# Patient Record
Sex: MALE | Race: WHITE | Employment: OTHER | URBAN - METROPOLITAN AREA
[De-identification: names, ages, dates, MRNs, and addresses within clinical notes are randomized per-mention and may not be internally consistent; named-entity substitution may affect disease eponyms.]

---

## 2017-08-29 RX ORDER — ATROPINE SULFATE 0.1 MG/ML
0.5 INJECTION INTRAVENOUS
Status: CANCELLED | OUTPATIENT
Start: 2017-08-29 | End: 2017-08-29

## 2017-08-29 RX ORDER — EPINEPHRINE 0.1 MG/ML
1 INJECTION INTRACARDIAC; INTRAVENOUS
Status: CANCELLED | OUTPATIENT
Start: 2017-08-29 | End: 2017-08-29

## 2017-08-29 RX ORDER — DEXTROMETHORPHAN/PSEUDOEPHED 2.5-7.5/.8
1.2 DROPS ORAL
Status: CANCELLED | OUTPATIENT
Start: 2017-08-29

## 2017-08-30 ENCOUNTER — HOSPITAL ENCOUNTER (OUTPATIENT)
Age: 77
Setting detail: OUTPATIENT SURGERY
Discharge: HOME | End: 2017-08-30
Attending: INTERNAL MEDICINE | Admitting: INTERNAL MEDICINE
Payer: MEDICARE

## 2017-08-30 VITALS
BODY MASS INDEX: 32.44 KG/M2 | HEART RATE: 61 BPM | RESPIRATION RATE: 17 BRPM | HEIGHT: 69 IN | DIASTOLIC BLOOD PRESSURE: 60 MMHG | TEMPERATURE: 97.2 F | OXYGEN SATURATION: 94 % | SYSTOLIC BLOOD PRESSURE: 130 MMHG | WEIGHT: 219 LBS

## 2017-08-30 PROCEDURE — G0500 MOD SEDAT ENDO SERVICE >5YRS: HCPCS | Performed by: INTERNAL MEDICINE

## 2017-08-30 PROCEDURE — 88305 TISSUE EXAM BY PATHOLOGIST: CPT | Performed by: INTERNAL MEDICINE

## 2017-08-30 PROCEDURE — 74011250636 HC RX REV CODE- 250/636

## 2017-08-30 PROCEDURE — 74011000250 HC RX REV CODE- 250: Performed by: INTERNAL MEDICINE

## 2017-08-30 PROCEDURE — 74011250636 HC RX REV CODE- 250/636: Performed by: INTERNAL MEDICINE

## 2017-08-30 PROCEDURE — 76040000007: Performed by: INTERNAL MEDICINE

## 2017-08-30 PROCEDURE — 88304 TISSUE EXAM BY PATHOLOGIST: CPT | Performed by: INTERNAL MEDICINE

## 2017-08-30 PROCEDURE — 77030011640 HC PAD GRND REM COVD -A: Performed by: INTERNAL MEDICINE

## 2017-08-30 PROCEDURE — 77030013991 HC SNR POLYP ENDOSC BSC -A: Performed by: INTERNAL MEDICINE

## 2017-08-30 RX ORDER — FLUMAZENIL 0.1 MG/ML
0.2 INJECTION INTRAVENOUS
Status: ACTIVE | OUTPATIENT
Start: 2017-08-30 | End: 2017-08-30

## 2017-08-30 RX ORDER — NALOXONE HYDROCHLORIDE 0.4 MG/ML
0.4 INJECTION, SOLUTION INTRAMUSCULAR; INTRAVENOUS; SUBCUTANEOUS
Status: ACTIVE | OUTPATIENT
Start: 2017-08-30 | End: 2017-08-30

## 2017-08-30 RX ORDER — SODIUM CHLORIDE 9 MG/ML
100 INJECTION, SOLUTION INTRAVENOUS CONTINUOUS
Status: DISPENSED | OUTPATIENT
Start: 2017-08-30 | End: 2017-08-30

## 2017-08-30 RX ORDER — MIDAZOLAM HYDROCHLORIDE 1 MG/ML
.5-5 INJECTION, SOLUTION INTRAMUSCULAR; INTRAVENOUS
Status: ACTIVE | OUTPATIENT
Start: 2017-08-30 | End: 2017-08-30

## 2017-08-30 RX ORDER — FENTANYL CITRATE 50 UG/ML
100 INJECTION, SOLUTION INTRAMUSCULAR; INTRAVENOUS
Status: ACTIVE | OUTPATIENT
Start: 2017-08-30 | End: 2017-08-30

## 2017-08-30 RX ADMIN — SODIUM CHLORIDE 100 ML/HR: 900 INJECTION, SOLUTION INTRAVENOUS at 12:51

## 2017-08-30 RX ADMIN — BENZOCAINE 2 SPRAY: 220 SPRAY, METERED PERIODONTAL at 13:07

## 2017-08-30 RX ADMIN — GLUCAGON HYDROCHLORIDE 0.5 MG: KIT at 12:22

## 2017-08-30 NOTE — DISCHARGE INSTRUCTIONS
[facility]  [id number]  1940    EGD DISCHARGE INSTRUCTIONS  Discomfort:  Sore throat- throat lozenges or warm salt water gargle  redness at IV site- apply warm compress to area; if redness or soreness persist- contact your physician  Gaseous discomfort- walking, belching will help relieve any discomfort  You may not operate a vehicle until the next day  You may not engage in an occupation involving machinery or appliances until the next day  You may not drink alcoholic beverages until the next day  Avoid making any critical decisions for at least 24 hour    DIET   You may not resume your regular diet. Antireflux diet. ACTIVITY  You may not resume your normal daily activities   Spend the remainder of the day resting -  avoid any strenuous activity. CALL M.D. ANY SIGN OF   Increasing pain, nausea, vomiting  Abdominal distension (swelling)  New increased bleeding (oral or rectal)  Fever (chills)  Pain in chest area  Bloody discharge from nose or mouth  Shortness of breath     You may not not take any Advil, Aspirin, Ibuprofen, Motrin, Aleve, or Goodys for 10 days, ONLY  Tylenol as needed for pain. Follow-up Instructions: Follow-up in the office as scheduled or make a follow-up appointment in 2 weeks. Jerman Little MD  August 30, 2017   DISCHARGE SUMMARY from Nurse    The following personal items are in your possession at time of discharge:    Dental Appliances: Uppers, Partials, At home, Lowers  Visual Aid: Glasses (reading)                            PATIENT INSTRUCTIONS:    After general anesthesia or intravenous sedation, for 24 hours or while taking prescription Narcotics:  · Limit your activities  · Do not drive and operate hazardous machinery  · Do not make important personal or business decisions  · Do  not drink alcoholic beverages  · If you have not urinated within 8 hours after discharge, please contact your surgeon on call.     Report the following to your surgeon:  · Excessive pain, swelling, redness or odor of or around the surgical area  · Temperature over 100.5  · Nausea and vomiting lasting longer than 4 hours or if unable to take medications  · Any signs of decreased circulation or nerve impairment to extremity: change in color, persistent  numbness, tingling, coldness or increase pain  · Any questions        What to do at Home:  Recommended activity: Activity as tolerated and no driving for today,     If you experience any of the following symptoms as above, please follow up with Dr. Obinna Mckeon. *  Please give a list of your current medications to your Primary Care Provider. *  Please update this list whenever your medications are discontinued, doses are      changed, or new medications (including over-the-counter products) are added. *  Please carry medication information at all times in case of emergency situations. These are general instructions for a healthy lifestyle:    No smoking/ No tobacco products/ Avoid exposure to second hand smoke    Surgeon General's Warning:  Quitting smoking now greatly reduces serious risk to your health. Obesity, smoking, and sedentary lifestyle greatly increases your risk for illness    A healthy diet, regular physical exercise & weight monitoring are important for maintaining a healthy lifestyle    You may be retaining fluid if you have a history of heart failure or if you experience any of the following symptoms:  Weight gain of 3 pounds or more overnight or 5 pounds in a week, increased swelling in our hands or feet or shortness of breath while lying flat in bed. Please call your doctor as soon as you notice any of these symptoms; do not wait until your next office visit.     Recognize signs and symptoms of STROKE:    F-face looks uneven    A-arms unable to move or move unevenly    S-speech slurred or non-existent    T-time-call 911 as soon as signs and symptoms begin-DO NOT go       Back to bed or wait to see if you get better-TIME IS BRAIN. Warning Signs of HEART ATTACK     Call 911 if you have these symptoms:   Chest discomfort. Most heart attacks involve discomfort in the center of the chest that lasts more than a few minutes, or that goes away and comes back. It can feel like uncomfortable pressure, squeezing, fullness, or pain.  Discomfort in other areas of the upper body. Symptoms can include pain or discomfort in one or both arms, the back, neck, jaw, or stomach.  Shortness of breath with or without chest discomfort.  Other signs may include breaking out in a cold sweat, nausea, or lightheadedness. Don't wait more than five minutes to call 911 - MINUTES MATTER! Fast action can save your life. Calling 911 is almost always the fastest way to get lifesaving treatment. Emergency Medical Services staff can begin treatment when they arrive -- up to an hour sooner than if someone gets to the hospital by car. The discharge information has been reviewed with the patient and spouse. The patient and spouse verbalized understanding. Discharge medications reviewed with the patient and spouse and appropriate educational materials and side effects teaching were provided.     Patient armband removed and shredded

## 2017-08-30 NOTE — IP AVS SNAPSHOT
[facility] [address] [address] 
[phone] Patient: Britney Stahl MRN: [id number] UMR:[id number] You are allergic to the following Allergen Reactions Codeine Nausea Only Phenobarbital Nausea Only Recent Documentation Height Weight BMI Smoking Status 1.753 m 99.3 kg 32.34 kg/m2 Former Smoker Emergency Contacts Name Discharge Info Relation Home Work Mobile Lynda Taylor  Spouse [phone] About your hospitalization You were admitted on:  August 30, 2017 You last received care in the:  [facility] ENDOSCOPY You were discharged on:  August 30, 2017 Unit phone number:  [phone] Why you were hospitalized Your primary diagnosis was:  Not on File Providers Seen During Your Hospitalizations Provider Role Specialty Primary office phone Marilin Brennan MD Attending Provider Gastroenterology [phone] Your Primary Care Physician (PCP) Primary Care Physician Office Phone Office Fax [address], [facility] Vira Mae [phone] Follow-up Information None Current Discharge Medication List  
  
ASK your doctor about these medications Dose & Instructions Dispensing Information Comments Morning Noon Evening Bedtime  
 amLODIPine 5 mg tablet Commonly known as:  Job Dub Your last dose was: Your next dose is:    
   
   
 Dose:  5 mg Take 5 mg by mouth daily. Indications: HYPERTENSION Refills:  0  
     
   
   
   
  
 amoxicillin 500 mg capsule Commonly known as:  AMOXIL Your last dose was: Your next dose is:    
   
   
 Dose:  500 mg Take 500 mg by mouth two (2) times daily as needed ([employer]). Refills:  0  
     
   
   
   
  
 aspirin delayed-release 81 mg tablet Your last dose was:     
   
Your next dose is:    
   
   
 Dose:  81 mg  
 Take 81 mg by mouth daily. Refills:  0 KRILL OIL PO Your last dose was: Your next dose is: Take  by mouth daily. Refills:  0  
     
   
   
   
  
 losartan 100 mg tablet Commonly known as:  COZAAR Your last dose was: Your next dose is:    
   
   
 Dose:  100 mg Take 100 mg by mouth daily. Indications: HYPERTENSION Refills:  0  
     
   
   
   
  
 metoprolol tartrate 25 mg tablet Commonly known as:  LOPRESSOR Your last dose was: Your next dose is:    
   
   
 Dose:  25 mg Take 25 mg by mouth two (2) times a day. Refills:  0  
     
   
   
   
  
 multivitamin tablet Commonly known as:  ONE A DAY Your last dose was: Your next dose is:    
   
   
 Dose:  1 Tab Take 1 Tab by mouth daily. Refills:  0  
     
   
   
   
  
 pantoprazole 40 mg tablet Commonly known as:  PROTONIX Your last dose was: Your next dose is:    
   
   
 Dose:  40 mg Take 40 mg by mouth daily. Indications: GASTROESOPHAGEAL REFLUX Refills:  0  
     
   
   
   
  
 simvastatin 40 mg tablet Commonly known as:  ZOCOR Your last dose was: Your next dose is:    
   
   
 Dose:  40 mg Take 40 mg by mouth nightly. Refills:  0  
     
   
   
   
  
 testosterone 20.25 mg/1.25 gram (1.62 %) gel Commonly known as:  ANDROGEL Your last dose was: Your next dose is:    
   
   
 Apply  to affected area daily. Refills:  0 VIAGRA 100 mg tablet Generic drug:  sildenafil citrate Your last dose was: Your next dose is:    
   
   
 Dose:  100 mg Take 100 mg by mouth as needed. Indications: ERECTILE DYSFUNCTION Refills:  0  
     
   
   
   
  
 VITAMIN D3 1,000 unit tablet Generic drug:  cholecalciferol Your last dose was: Your next dose is:    
   
   
 Dose:  2000 Units Take 2,000 Units by mouth daily. Refills:  0 Discharge Instructions Shruti Moore [id number] 
1940 EGD DISCHARGE INSTRUCTIONS Discomfort: 
Sore throat- throat lozenges or warm salt water gargle 
redness at IV site- apply warm compress to area; if redness or soreness persist- contact your physician Gaseous discomfort- walking, belching will help relieve any discomfort You may not operate a vehicle until the next day You may not engage in an occupation involving machinery or appliances until the next day You may not drink alcoholic beverages until the next day Avoid making any critical decisions for at least 24 hour DIET You may not resume your regular diet. Antireflux diet. ACTIVITY You may not resume your normal daily activities Spend the remainder of the day resting -  avoid any strenuous activity. CALL M.D. ANY SIGN OF Increasing pain, nausea, vomiting Abdominal distension (swelling) New increased bleeding (oral or rectal) Fever (chills) Pain in chest area Bloody discharge from nose or mouth Shortness of breath You may not not take any Advil, Aspirin, Ibuprofen, Motrin, Aleve, or Goodys for 10 days, ONLY  Tylenol as needed for pain. Follow-up Instructions: Follow-up in the office as scheduled or make a follow-up appointment in 2 weeks. Juan Griffith MD 
August 30, 2017 DISCHARGE SUMMARY from Nurse The following personal items are in your possession at time of discharge: 
 
Dental Appliances: Uppers, Partials, At home, Lowers Visual Aid: Glasses (reading) PATIENT INSTRUCTIONS: 
 
 
F-face looks uneven A-arms unable to move or move unevenly S-speech slurred or non-existent T-time-call 911 as soon as signs and symptoms begin-DO NOT go Back to bed or wait to see if you get better-TIME IS BRAIN. Warning Signs of HEART ATTACK Call 911 if you have these symptoms: 
? Chest discomfort. Most heart attacks involve discomfort in the center of the chest that lasts more than a few minutes, or that goes away and comes back. It can feel like uncomfortable pressure, squeezing, fullness, or pain. ? Discomfort in other areas of the upper body. Symptoms can include pain or discomfort in one or both arms, the back, neck, jaw, or stomach. ? Shortness of breath with or without chest discomfort. ? Other signs may include breaking out in a cold sweat, nausea, or lightheadedness. Don't wait more than five minutes to call [address]! Fast action can save your life. Calling 911 is almost always the fastest way to get lifesaving treatment. Emergency Medical Services staff can begin treatment when they arrive  up to an hour sooner than if someone gets to the hospital by car. The discharge information has been reviewed with the patient and spouse. The patient and spouse verbalized understanding. Discharge medications reviewed with the patient and spouse and appropriate educational materials and side effects teaching were provided. Patient armband removed and shredded Discharge Orders None Introducing [facility]! [employer] introduces [employer] patient portal. Now you can access parts of your medical record, email your doctor's office, and request medication refills online. 1. In your internet browser, go to [email or web address] 2. Click on the First Time User? Click Here link in the Sign In box. You will see the New Member Sign Up page. 3. Enter your [employer] Access Code exactly as it appears below.  You will not need to use this code after youve completed the sign-up process. If you do not sign up before the expiration date, you must request a new code. · [address] Access Code: [email or web address] Expires: 11/19/2017  6:52 AM 
 
4. Enter the last four digits of your Social Security Number (xxxx) and Date of Birth (mm/dd/yyyy) as indicated and click Submit. You will be taken to the next sign-up page. 5. Create a [address] ID. This will be your [facility] [address] login ID and cannot be changed, so think of one that is secure and easy to remember. 6. Create a [address] password. You can change your password at any time. 7. Enter your Password Reset Question and Answer. This can be used at a later time if you forget your password. 8. Enter your e-mail address. You will receive e-mail notification when new information is available in [address] 9. Click Sign Up. You can now view and download portions of your medical record. 10. Click the Download Summary menu link to download a portable copy of your medical information. If you have questions, please visit the Frequently Asked Questions section of the [employer] website. Remember, [facility] is NOT to be used for urgent needs. For medical emergencies, dial 911. Now available from your iPhone and Android! General Information Please provide this summary of care documentation to your next provider. Patient Signature:  ____________________________________________________________ Date:  ____________________________________________________________  
  
[facility] Provider Signature:  ____________________________________________________________ Date:  ____________________________________________________________

## 2017-08-30 NOTE — PROCEDURES
[facility]  Esophagogastroduodenoscopy Procedure Report  _______________________________________________________  Patient: Winnie Morse                                         Attending Physician: Bernard Chan MD    Patient ID: [id number]                                      Referring Physician: Mandy Browne MD    Exam Date: August 30, 2017 _______________________________________________________      Introduction: A  68 y.o. male patient, presents for Esophagogastroduodenoscopy Procedure. Indication: chronic GERD on Protonix 40 mg daily. On EGD done last year April 27 2016 showed a 1.5 cm submucosal nodule or tumor in the proximal jejunum. The biopsy of the mucosa was normal. We wanted to have a second look at the lesion after one year to make sure it has not increased in size    : Bernard Chan MD    Sedation:    Versed 69 mg IV, fentanyl 100 mcg IV, topical Hurricaine spray, glucagon . 5 mg IV      Procedure Details:  After infomed consent was obtained for the procedure, with all risks and benefits of procedure explained the patient was taken to the endoscopy suite and placed in the left lateral decubitus position. Following sequential administration of sedation as per above, the endoscope was inserted into the mouth and advanced under direct vision to proximal jejunum. A careful inspection was made as the gastroscope was withdrawn, including a retroflexed view of the proximal stomach; findings and interventions are described below. Findings:   HYPOPHARYNX AND LARYNX: Normal  Esophagus: Normal proximal, middle and distal esophagus possibly a mild distal esophageal smooth stenosis. This time no visible Hiatal hernia. Stomach: No food or liquid retention. Normal, cardia, fundus, body, lesser curvature, incisura, antrum and pylorus except for a small isolated prepyloric erosion. Mucosa is normal.    Duodenum/jejunum:    The bulb, second, third portions and major papilla are normal submucosal semipedunculated polypoid lesion about >13 mm in diameter with normal overlying mucosa but because of its relatively large size and being semipedunculated it was resected with hot snare revealing what appears to be a lipoma at the section line. So it is a completely benign lesion    Therapies:    1 polypectomy were performed using hot snare  and the polyps were  retrieved    Specimen: One           Complications:   None    EBL:  None. 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 This a benign lesion and does not need future endoscopic follow up          Recommendations: -Continue acid suppression. , -GERD diet: avoid fried and fatty foods. peppermint, chocolate, alcohol, coffee, citrus fruits and juices, tomoato products; avoid lying down for 2 to 3 hours after eating., -No NSAID'S.     Assistant: none    Bernard Chan MD  8/30/2017  1:29 PM

## 2019-05-26 ENCOUNTER — APPOINTMENT (OUTPATIENT)
Dept: ULTRASOUND IMAGING | Age: 79
End: 2019-05-26
Attending: EMERGENCY MEDICINE
Payer: MEDICARE

## 2019-05-26 ENCOUNTER — HOSPITAL ENCOUNTER (EMERGENCY)
Age: 79
Discharge: HOME | End: 2019-05-26
Attending: EMERGENCY MEDICINE
Payer: MEDICARE

## 2019-05-26 ENCOUNTER — APPOINTMENT (OUTPATIENT)
Dept: GENERAL RADIOLOGY | Age: 79
End: 2019-05-26
Attending: EMERGENCY MEDICINE
Payer: MEDICARE

## 2019-05-26 VITALS
TEMPERATURE: 99 F | RESPIRATION RATE: 20 BRPM | HEIGHT: 68 IN | SYSTOLIC BLOOD PRESSURE: 123 MMHG | DIASTOLIC BLOOD PRESSURE: 57 MMHG | BODY MASS INDEX: 33.04 KG/M2 | HEART RATE: 57 BPM | OXYGEN SATURATION: 96 % | WEIGHT: 218 LBS

## 2019-05-26 DIAGNOSIS — R07.9 CHEST PAIN, UNSPECIFIED TYPE: Primary | ICD-10-CM

## 2019-05-26 LAB
ALBUMIN SERPL-MCNC: 3.7 G/DL (ref 3.4–5)
ALBUMIN/GLOB SERPL: 1.2 {RATIO} (ref 0.8–1.7)
ALP SERPL-CCNC: 79 U/L (ref 45–117)
ALT SERPL-CCNC: 18 U/L (ref 16–61)
ANION GAP SERPL CALC-SCNC: 7 MMOL/L (ref 3–18)
AST SERPL-CCNC: 16 U/L (ref 15–37)
ATRIAL RATE: 63 BPM
BASOPHILS # BLD: 0 K/UL (ref 0–0.1)
BASOPHILS NFR BLD: 0 % (ref 0–2)
BILIRUB SERPL-MCNC: 1.1 MG/DL (ref 0.2–1)
BUN SERPL-MCNC: 18 MG/DL (ref 7–18)
BUN/CREAT SERPL: 20 (ref 12–20)
CALCIUM SERPL-MCNC: 9.3 MG/DL (ref 8.5–10.1)
CALCULATED P AXIS, ECG09: 72 DEGREES
CALCULATED R AXIS, ECG10: 15 DEGREES
CALCULATED T AXIS, ECG11: 58 DEGREES
CHLORIDE SERPL-SCNC: 104 MMOL/L (ref 100–108)
CK MB CFR SERPL CALC: 2.2 % (ref 0–4)
CK MB SERPL-MCNC: 1 NG/ML (ref 5–25)
CK SERPL-CCNC: 45 U/L (ref 39–308)
CO2 SERPL-SCNC: 31 MMOL/L (ref 21–32)
CREAT SERPL-MCNC: 0.88 MG/DL (ref 0.6–1.3)
DIAGNOSIS, 93000: NORMAL
DIFFERENTIAL METHOD BLD: ABNORMAL
EOSINOPHIL # BLD: 0.1 K/UL (ref 0–0.4)
EOSINOPHIL NFR BLD: 1 % (ref 0–5)
ERYTHROCYTE [DISTWIDTH] IN BLOOD BY AUTOMATED COUNT: 12.1 % (ref 11.6–14.5)
GLOBULIN SER CALC-MCNC: 3 G/DL (ref 2–4)
GLUCOSE SERPL-MCNC: 126 MG/DL (ref 74–99)
HCT VFR BLD AUTO: 41.1 % (ref 36–48)
HGB BLD-MCNC: 13.5 G/DL (ref 13–16)
LIPASE SERPL-CCNC: 96 U/L (ref 73–393)
LYMPHOCYTES # BLD: 0.8 K/UL (ref 0.9–3.6)
LYMPHOCYTES NFR BLD: 13 % (ref 21–52)
MCH RBC QN AUTO: 33.7 PG (ref 24–34)
MCHC RBC AUTO-ENTMCNC: 32.8 G/DL (ref 31–37)
MCV RBC AUTO: 102.5 FL (ref 74–97)
MONOCYTES # BLD: 0.4 K/UL (ref 0.05–1.2)
MONOCYTES NFR BLD: 7 % (ref 3–10)
NEUTS SEG # BLD: 4.9 K/UL (ref 1.8–8)
NEUTS SEG NFR BLD: 79 % (ref 40–73)
P-R INTERVAL, ECG05: 184 MS
PLATELET # BLD AUTO: 153 K/UL (ref 135–420)
PMV BLD AUTO: 10.6 FL (ref 9.2–11.8)
POTASSIUM SERPL-SCNC: 4.2 MMOL/L (ref 3.5–5.5)
PROT SERPL-MCNC: 6.7 G/DL (ref 6.4–8.2)
Q-T INTERVAL, ECG07: 406 MS
QRS DURATION, ECG06: 100 MS
QTC CALCULATION (BEZET), ECG08: 415 MS
RBC # BLD AUTO: 4.01 M/UL (ref 4.7–5.5)
SODIUM SERPL-SCNC: 142 MMOL/L (ref 136–145)
TROPONIN I SERPL-MCNC: <0.02 NG/ML (ref 0–0.04)
VENTRICULAR RATE, ECG03: 63 BPM
WBC # BLD AUTO: 6.3 K/UL (ref 4.6–13.2)

## 2019-05-26 PROCEDURE — 93005 ELECTROCARDIOGRAM TRACING: CPT

## 2019-05-26 PROCEDURE — 83690 ASSAY OF LIPASE: CPT

## 2019-05-26 PROCEDURE — 82550 ASSAY OF CK (CPK): CPT

## 2019-05-26 PROCEDURE — 80053 COMPREHEN METABOLIC PANEL: CPT

## 2019-05-26 PROCEDURE — 85025 COMPLETE CBC W/AUTO DIFF WBC: CPT

## 2019-05-26 PROCEDURE — 71046 X-RAY EXAM CHEST 2 VIEWS: CPT

## 2019-05-26 PROCEDURE — 99284 EMERGENCY DEPT VISIT MOD MDM: CPT

## 2019-05-26 PROCEDURE — 76705 ECHO EXAM OF ABDOMEN: CPT

## 2019-05-26 NOTE — ED TRIAGE NOTES
Patient ambulatory  into ER c/o chest pain x 2 days. Pt denies N/V, denies diaphoresis w/ CP.   Pt describes the pain as beginning in his throat and traveling down the chest.

## 2019-05-26 NOTE — ED PROVIDER NOTES
EMERGENCY DEPARTMENT HISTORY AND PHYSICAL EXAM    Date: 5/26/2019  Patient Name: Mandie Simpson    History of Presenting Illness     Chief Complaint   Patient presents with    Chest Pain         History Provided By: Patient    1:52 PM  Mandie Simpson is a 66 y.o. male with PMHX of high blood pressure and coronary artery disease who presents to the emergency department C/O intermittent chest pain for the last 2 days. It generally is worse when he eats or drinks anything. Pain is described as pain/ indigestion, pain does go from the patient's throat down to his upper abdomen,  is not made worse with exertion. He has no nausea no vomiting no shortness of breath no diaphoresis. PCP: Karma Barnett MD    Current Outpatient Medications   Medication Sig Dispense Refill    sildenafil citrate (VIAGRA) 100 mg tablet Take 100 mg by mouth as needed. Indications: ERECTILE DYSFUNCTION      amoxicillin (AMOXIL) 500 mg capsule Take 500 mg by mouth two (2) times daily as needed (Rosecea).  metoprolol (LOPRESSOR) 25 mg tablet Take 25 mg by mouth two (2) times a day.  simvastatin (ZOCOR) 40 mg tablet Take 40 mg by mouth nightly.  amLODIPine (NORVASC) 5 mg tablet Take 5 mg by mouth daily. Indications: HYPERTENSION      aspirin delayed-release 81 mg tablet Take 81 mg by mouth daily.  testosterone (ANDROGEL) 20.25 mg/1.25 gram (1.62 %) gel Apply  to affected area daily.  losartan (COZAAR) 100 mg tablet Take 100 mg by mouth daily. Indications: HYPERTENSION      pantoprazole (PROTONIX) 40 mg tablet Take 40 mg by mouth daily. Indications: GASTROESOPHAGEAL REFLUX      multivitamin (ONE A DAY) tablet Take 1 Tab by mouth daily.  cholecalciferol (VITAMIN D3) 1,000 unit tablet Take 2,000 Units by mouth daily.  KRILL OIL PO Take  by mouth daily.          Past History     Past Medical History:  Past Medical History:   Diagnosis Date    Arthritis     CAD (coronary artery disease) s/p CABG    GERD (gastroesophageal reflux disease)     well controlled    Hypercholesteremia     Hypertension     Morbid obesity ([address]        Past Surgical History:  Past Surgical History:   Procedure Laterality Date    ABDOMEN SURGERY PROC UNLISTED Right     hernia repair    CARDIAC SURG PROCEDURE UNLIST      CABGX2 or 3    COLONOSCOPY N/A 10/7/2016    COLONOSCOPY, POLYPECTOMY performed by Kemar Frances MD at [facility] ENDOSCOPY    HX APPENDECTOMY      HX [address] Right 10/14/2018    HX ORTHOPAEDIC Right     total right knee replacement    HX ORTHOPAEDIC      back surgery    HX SHOULDER REPLACEMENT Right     HX TONSIL AND ADENOIDECTOMY         Family History:  History reviewed. No pertinent family history. Social History:  Social History     Tobacco Use    Smoking status: Former Smoker     Last attempt to quit: 10/5/1976     Years since quittin.6    Smokeless tobacco: Never Used   Substance Use Topics    Alcohol use: Yes     Alcohol/week: 4.8 - 5.4 oz     Types: 7 Shots of liquor, 1 - 2 Standard drinks or equivalent per week    Drug use: No       Allergies: Allergies   Allergen Reactions    Codeine Nausea Only    Phenobarbital Nausea Only         Review of Systems   Review of Systems   Constitutional: Negative for fever. HENT: Negative for ear pain and hearing loss. Eyes: Negative for pain and visual disturbance. Respiratory: Negative for shortness of breath. Cardiovascular: Negative for chest pain. Gastrointestinal: Negative for abdominal pain. Genitourinary: Negative for difficulty urinating and dysuria. Neurological: Negative for dizziness, light-headedness and headaches.          Physical Exam     Vitals:    19 1314 19 1500   BP: 139/49 170/67   Pulse: (!) 56 (!) 59   Resp: 15 20   Temp: 99 °F (37.2 °C)    SpO2: 95% 99%   Weight: 98.9 kg (218 lb)    Height: 5' 8\" (1.727 m)      Physical Exam   Constitutional: He is oriented to person, place, and time. He appears well-developed. HENT:   Head: Normocephalic and atraumatic. Eyes: Pupils are equal, round, and reactive to light. Neck: Normal range of motion. Cardiovascular: Normal rate and regular rhythm. Pulmonary/Chest: Effort normal and breath sounds normal.   Abdominal: Soft. Musculoskeletal: Normal range of motion. Neurological: He is alert and oriented to person, place, and time. Psychiatric: He has a normal mood and affect. Nursing note and vitals reviewed. Diagnostic Study Results     Labs -     Recent Results (from the past 12 hour(s))   EKG, 12 LEAD, INITIAL    Collection Time: 05/26/19  1:36 PM   Result Value Ref Range    Ventricular Rate 63 BPM    Atrial Rate 63 BPM    P-R Interval 184 ms    QRS Duration 100 ms    Q-T Interval 406 ms    QTC Calculation (Bezet) 415 ms    Calculated P Axis 72 degrees    Calculated R Axis 15 degrees    Calculated T Axis 58 degrees    Diagnosis       Normal sinus rhythm with sinus arrhythmia  Incomplete right bundle branch block  Anterior infarct , age undetermined  Abnormal ECG  When compared with ECG of 29-FEB-2016 17:28,  NE interval has decreased  Anterior infarct is now present     CBC WITH AUTOMATED DIFF    Collection Time: 05/26/19  1:53 PM   Result Value Ref Range    WBC 6.3 4.6 - 13.2 K/uL    RBC 4.01 (L) 4.70 - 5.50 M/uL    HGB 13.5 13.0 - 16.0 g/dL    HCT 41.1 36.0 - 48.0 %    .5 (H) 74.0 - 97.0 FL    MCH 33.7 24.0 - 34.0 PG    MCHC 32.8 31.0 - 37.0 g/dL    RDW 12.1 11.6 - 14.5 %    PLATELET 368 752 - 510 K/uL    MPV 10.6 9.2 - 11.8 FL    NEUTROPHILS 79 (H) 40 - 73 %    LYMPHOCYTES 13 (L) 21 - 52 %    MONOCYTES 7 3 - 10 %    EOSINOPHILS 1 0 - 5 %    BASOPHILS 0 0 - 2 %    ABS. NEUTROPHILS 4.9 1.8 - 8.0 K/UL    ABS. LYMPHOCYTES 0.8 (L) 0.9 - 3.6 K/UL    ABS. MONOCYTES 0.4 0.05 - 1.2 K/UL    ABS. EOSINOPHILS 0.1 0.0 - 0.4 K/UL    ABS.  BASOPHILS 0.0 0.0 - 0.1 K/UL    DF AUTOMATED     METABOLIC PANEL, COMPREHENSIVE Collection Time: 05/26/19  1:53 PM   Result Value Ref Range    Sodium 142 136 - 145 mmol/L    Potassium 4.2 3.5 - 5.5 mmol/L    Chloride 104 100 - 108 mmol/L    CO2 31 21 - 32 mmol/L    Anion gap 7 3.0 - 18 mmol/L    Glucose 126 (H) 74 - 99 mg/dL    BUN 18 7.0 - 18 MG/DL    Creatinine 0.88 0.6 - 1.3 MG/DL    BUN/Creatinine ratio 20 12 - 20      GFR est AA >60 >60 ml/min/1.73m2    GFR est non-AA >60 >60 ml/min/1.73m2    Calcium 9.3 8.5 - 10.1 MG/DL    Bilirubin, total 1.1 (H) 0.2 - 1.0 MG/DL    ALT (SGPT) 18 16 - 61 U/L    AST (SGOT) 16 15 - 37 U/L    Alk. phosphatase 79 45 - 117 U/L    Protein, total 6.7 6.4 - 8.2 g/dL    Albumin 3.7 3.4 - 5.0 g/dL    Globulin 3.0 2.0 - 4.0 g/dL    A-G Ratio 1.2 0.8 - 1.7     LIPASE    Collection Time: 05/26/19  1:53 PM   Result Value Ref Range    Lipase 96 73 - 393 U/L   CARDIAC PANEL,(CK, CKMB & TROPONIN)    Collection Time: 05/26/19  1:53 PM   Result Value Ref Range    CK 45 39 - 308 U/L    CK - MB 1.0 <3.6 ng/ml    CK-MB Index 2.2 0.0 - 4.0 %    Troponin-I, QT <0.02 0.0 - 0.045 NG/ML       Radiologic Studies -   US ABD LTD   Final Result      No significant findings to explain the patient's abdominal pain      XR CHEST PA LAT   Final Result      No acute pulmonary findings         CT Results  (Last 48 hours)    None        CXR Results  (Last 48 hours)               05/26/19 1403  XR CHEST PA LAT Final result    Impression:      No acute pulmonary findings        Narrative:  EXAM: PA and lateral views of the chest       INDICATION: Chest pain       COMPARISON: February 29, 2016       _______________       FINDINGS:       There are median sternotomy wires. The heart is normal size. The mediastinum is   within normal limits. There is no consolidation. Pulmonary vascularity is within   normal limits. No pleural effusion or pneumothorax. No acute osseous   abnormalities.        _______________                 Medications given in the ED-  Medications - No data to display      Medical Decision Making   I am the first provider for this patient. I reviewed the vital signs, available nursing notes, past medical history, past surgical history, family history and social history. EKG interpretation: (Preliminary)  EKG read by Dr. Jenelle Carreon at 1:53 PM   EKG shows a sinus rhythm with incomplete right bundle branch block with a rate of 63 and a QTC of 415. Records Reviewed: Old Medical Records    Provider Notes (Medical Decision Making): Deann Pitts is a 66 y.o. male history of coronary artery disease, hypertension presenting with concern for intermittent chest pain that is worse with eating, and patient states the pain goes from his neck down to his upper abdomen. Currently symptom-free. EKG shows no acute signs of ischemia. Will check basic labs and a chest xray, as well as an ultrasound given the pain radiates to the upper abdomen and is worse with food. Procedures:  Procedures    ED Course:   3:01 PM  On reevaluation patient is feeling better he has an unremarkable ultrasound and labs are unremarkable as well. Patient was offered to stay in the hospital for further evaluation of his chest discomfort however patient states he thinks this is his indigestion and does not want to stay for even delta troponin and would like to be discharged. He was strongly encouraged to follow-up with his PCM on Tuesday but strict return precautions were given. Diagnosis and Disposition       DISCHARGE NOTE:    Siena Horn's  results have been reviewed with him. He has been counseled regarding his diagnosis, treatment, and plan. He verbally conveys understanding and agreement of the signs, symptoms, diagnosis, treatment and prognosis and additionally agrees to follow up as discussed. He also agrees with the care-plan and conveys that all of his questions have been answered.   I have also provided discharge instructions for him that include: educational information regarding their diagnosis and treatment, and list of reasons why they would want to return to the ED prior to their follow-up appointment, should his condition change. He has been provided with education for proper emergency department utilization. CLINICAL IMPRESSION:    1. Chest pain, unspecified type        PLAN:  1. D/C Home  2. Current Discharge Medication List        3. Follow-up Information     Follow up With Specialties Details Why Contact Info    Lai Delgado MD Family Practice  Follow up tuesday for reevauation  [address] [facility]  [phone]          _______________________________      Please note that this dictation was completed with Yelp, the computer voice recognition software. Quite often unanticipated grammatical, syntax, homophones, and other interpretive errors are inadvertently transcribed by the computer software. Please disregard these errors. Please excuse any errors that have escaped final proofreading.

## 2019-05-26 NOTE — DISCHARGE INSTRUCTIONS

## 2023-01-01 ENCOUNTER — APPOINTMENT (OUTPATIENT)
Facility: HOSPITAL | Age: 83
End: 2023-01-01
Payer: MEDICARE

## 2023-01-01 ENCOUNTER — HOSPITAL ENCOUNTER (EMERGENCY)
Facility: HOSPITAL | Age: 83
Discharge: HOME | End: 2023-03-04
Payer: MEDICARE

## 2023-01-01 ENCOUNTER — HOSPITAL ENCOUNTER (EMERGENCY)
Facility: HOSPITAL | Age: 83
End: 2023-03-01
Attending: EMERGENCY MEDICINE
Payer: MEDICARE

## 2023-01-01 VITALS
OXYGEN SATURATION: 94 % | RESPIRATION RATE: 18 BRPM | BODY MASS INDEX: 26.41 KG/M2 | WEIGHT: 173.7 LBS | TEMPERATURE: 98.7 F | SYSTOLIC BLOOD PRESSURE: 58 MMHG | HEART RATE: 40 BPM | DIASTOLIC BLOOD PRESSURE: 35 MMHG

## 2023-01-01 DIAGNOSIS — S27.0XXA TRAUMATIC PNEUMOTHORAX, INITIAL ENCOUNTER: ICD-10-CM

## 2023-01-01 DIAGNOSIS — I46.9 CARDIAC ARREST (HCC): ICD-10-CM

## 2023-01-01 DIAGNOSIS — R11.2 NAUSEA AND VOMITING, UNSPECIFIED VOMITING TYPE: Primary | ICD-10-CM

## 2023-01-01 LAB
ANION GAP BLD CALC-SCNC: ABNORMAL (ref 10–20)
ARTERIAL PATENCY WRIST A: ABNORMAL
BASE DEFICIT BLD-SCNC: 10.6 MMOL/L
BASE DEFICIT BLD-SCNC: 7.5 MMOL/L
BASOPHILS # BLD: 0.1 K/UL (ref 0–0.1)
BASOPHILS NFR BLD: 0 % (ref 0–2)
BDY SITE: ABNORMAL
CA-I BLD-MCNC: 1.3 MMOL/L (ref 1.12–1.32)
CHLORIDE BLD-SCNC: 104 MMOL/L (ref 98–107)
CO2 BLD-SCNC: 26 MMOL/L (ref 19–24)
CREAT BLD-MCNC: 1.05 MG/DL (ref 0.6–1.3)
DIFFERENTIAL METHOD BLD: ABNORMAL
EKG DIAGNOSIS: NORMAL
EKG Q-T INTERVAL: 456 MS
EKG QRS DURATION: 98 MS
EKG QTC CALCULATION (BAZETT): 459 MS
EKG R AXIS: -10 DEGREES
EKG T AXIS: 51 DEGREES
EKG VENTRICULAR RATE: 61 BPM
EOSINOPHIL # BLD: 0.1 K/UL (ref 0–0.4)
EOSINOPHIL NFR BLD: 1 % (ref 0–5)
ERYTHROCYTE [DISTWIDTH] IN BLOOD BY AUTOMATED COUNT: 12 % (ref 11.6–14.5)
GAS FLOW.O2 O2 DELIVERY SYS: ABNORMAL
GAS FLOW.O2 SETTING OXYMISER: 25 BPM
GLUCOSE BLD STRIP.AUTO-MCNC: 244 MG/DL (ref 70–110)
GLUCOSE BLD-MCNC: 254 MG/DL (ref 65–100)
HCO3 BLD-SCNC: 21.8 MMOL/L (ref 22–26)
HCO3 BLD-SCNC: 25.5 MMOL/L (ref 22–26)
HCT VFR BLD AUTO: 41.8 % (ref 36–48)
HGB BLD-MCNC: 12.9 G/DL (ref 13–16)
IMM GRANULOCYTES # BLD AUTO: 0.7 K/UL (ref 0–0.04)
IMM GRANULOCYTES NFR BLD AUTO: 4 % (ref 0–0.5)
LACTATE BLD-SCNC: 11.88 MMOL/L (ref 0.4–2)
LYMPHOCYTES # BLD: 3.2 K/UL (ref 0.9–3.6)
LYMPHOCYTES NFR BLD: 19 % (ref 21–52)
MCH RBC QN AUTO: 33.5 PG (ref 24–34)
MCHC RBC AUTO-ENTMCNC: 30.9 G/DL (ref 31–37)
MCV RBC AUTO: 108.6 FL (ref 78–100)
MONOCYTES # BLD: 0.7 K/UL (ref 0.05–1.2)
MONOCYTES NFR BLD: 4 % (ref 3–10)
NEUTS SEG # BLD: 12.4 K/UL (ref 1.8–8)
NEUTS SEG NFR BLD: 72 % (ref 40–73)
NRBC # BLD: 0.05 K/UL (ref 0–0.01)
NRBC BLD-RTO: 0.3 PER 100 WBC
O2/TOTAL GAS SETTING VFR VENT: 100 %
PCO2 BLD: 83.1 MMHG (ref 35–45)
PCO2 BLDV: 94.1 MMHG (ref 41–51)
PEEP RESPIRATORY: 5 CMH2O
PH BLD: 7.03 (ref 7.35–7.45)
PH BLDV: 7.04 (ref 7.32–7.42)
PLATELET # BLD AUTO: 136 K/UL (ref 135–420)
PMV BLD AUTO: 11.4 FL (ref 9.2–11.8)
PO2 BLD: 138 MMHG (ref 80–100)
PO2 BLDV: 30 MMHG (ref 25–40)
POTASSIUM BLD-SCNC: 5 MMOL/L (ref 3.5–5.1)
RBC # BLD AUTO: 3.85 M/UL (ref 4.35–5.65)
SAO2 % BLD: 33 %
SAO2 % BLD: 97.1 % (ref 92–97)
SERVICE CMNT-IMP: ABNORMAL
SERVICE CMNT-IMP: ABNORMAL
SODIUM BLD-SCNC: 145 MMOL/L (ref 136–145)
SPECIMEN SITE: ABNORMAL
SPECIMEN TYPE: ABNORMAL
VENTILATION MODE VENT: ABNORMAL
VT SETTING VENT: 400 ML
WBC # BLD AUTO: 17.2 K/UL (ref 4.6–13.2)

## 2023-01-01 PROCEDURE — 2580000003 HC RX 258: Performed by: EMERGENCY MEDICINE

## 2023-01-01 PROCEDURE — 92950 HEART/LUNG RESUSCITATION CPR: CPT

## 2023-01-01 PROCEDURE — 36600 WITHDRAWAL OF ARTERIAL BLOOD: CPT

## 2023-01-01 PROCEDURE — 96365 THER/PROPH/DIAG IV INF INIT: CPT

## 2023-01-01 PROCEDURE — 99285 EMERGENCY DEPT VISIT HI MDM: CPT

## 2023-01-01 PROCEDURE — 71045 X-RAY EXAM CHEST 1 VIEW: CPT

## 2023-01-01 PROCEDURE — 2500000003 HC RX 250 WO HCPCS

## 2023-01-01 PROCEDURE — 85025 COMPLETE CBC W/AUTO DIFF WBC: CPT

## 2023-01-01 PROCEDURE — 82947 ASSAY GLUCOSE BLOOD QUANT: CPT

## 2023-01-01 PROCEDURE — 82330 ASSAY OF CALCIUM: CPT

## 2023-01-01 PROCEDURE — 96366 THER/PROPH/DIAG IV INF ADDON: CPT

## 2023-01-01 PROCEDURE — 2500000003 HC RX 250 WO HCPCS: Performed by: EMERGENCY MEDICINE

## 2023-01-01 PROCEDURE — 93005 ELECTROCARDIOGRAM TRACING: CPT | Performed by: EMERGENCY MEDICINE

## 2023-01-01 PROCEDURE — 6360000002 HC RX W HCPCS: Performed by: EMERGENCY MEDICINE

## 2023-01-01 PROCEDURE — 82803 BLOOD GASES ANY COMBINATION: CPT

## 2023-01-01 PROCEDURE — 94002 VENT MGMT INPAT INIT DAY: CPT

## 2023-01-01 RX ORDER — EPINEPHRINE 0.1 MG/ML
SYRINGE (ML) INJECTION DAILY PRN
Status: COMPLETED | OUTPATIENT
Start: 2023-01-01 | End: 2023-01-01

## 2023-01-01 RX ORDER — NOREPINEPHRINE BIT/0.9 % NACL 16MG/250ML
INFUSION BOTTLE (ML) INTRAVENOUS
Status: COMPLETED
Start: 2023-01-01 | End: 2023-01-01

## 2023-01-01 RX ORDER — NOREPINEPHRINE BIT/0.9 % NACL 16MG/250ML
1-100 INFUSION BOTTLE (ML) INTRAVENOUS CONTINUOUS
Status: DISCONTINUED | OUTPATIENT
Start: 2023-01-01 | End: 2023-01-01 | Stop reason: HOSPADM

## 2023-01-01 RX ORDER — 0.9 % SODIUM CHLORIDE 0.9 %
INTRAVENOUS SOLUTION INTRAVENOUS CONTINUOUS PRN
Status: COMPLETED | OUTPATIENT
Start: 2023-01-01 | End: 2023-01-01

## 2023-01-01 RX ORDER — CALCIUM CHLORIDE 100 MG/ML
INJECTION INTRAVENOUS; INTRAVENTRICULAR DAILY PRN
Status: COMPLETED | OUTPATIENT
Start: 2023-01-01 | End: 2023-01-01

## 2023-01-01 RX ADMIN — CALCIUM CHLORIDE 1000 MG: 100 INJECTION, SOLUTION INTRAVENOUS at 03:06

## 2023-01-01 RX ADMIN — SODIUM BICARBONATE 50 MEQ: 84 INJECTION, SOLUTION INTRAVENOUS at 03:10

## 2023-01-01 RX ADMIN — SODIUM CHLORIDE 1000 ML: 900 INJECTION, SOLUTION INTRAVENOUS at 03:06

## 2023-01-01 RX ADMIN — EPINEPHRINE 200 MCG: 0.1 INJECTION, SOLUTION ENDOTRACHEAL; INTRACARDIAC; INTRAVENOUS at 04:00

## 2023-01-01 RX ADMIN — EPINEPHRINE 1 MG: 0.1 INJECTION, SOLUTION ENDOTRACHEAL; INTRACARDIAC; INTRAVENOUS at 03:10

## 2023-01-01 RX ADMIN — EPINEPHRINE 100 MCG: 0.1 INJECTION, SOLUTION ENDOTRACHEAL; INTRACARDIAC; INTRAVENOUS at 04:05

## 2023-01-01 RX ADMIN — Medication 4 MCG/MIN: at 03:16

## 2023-01-01 RX ADMIN — EPINEPHRINE 100 MCG: 0.1 INJECTION, SOLUTION ENDOTRACHEAL; INTRACARDIAC; INTRAVENOUS at 04:17

## 2023-01-01 RX ADMIN — EPINEPHRINE 1 MG: 0.1 INJECTION, SOLUTION ENDOTRACHEAL; INTRACARDIAC; INTRAVENOUS at 03:06

## 2023-02-15 ENCOUNTER — HOSPITAL ENCOUNTER (EMERGENCY)
Facility: HOSPITAL | Age: 83
Discharge: HOME | End: 2023-02-15
Attending: STUDENT IN AN ORGANIZED HEALTH CARE EDUCATION/TRAINING PROGRAM
Payer: MEDICARE

## 2023-02-15 ENCOUNTER — APPOINTMENT (OUTPATIENT)
Facility: HOSPITAL | Age: 83
End: 2023-02-15
Payer: MEDICARE

## 2023-02-15 VITALS
WEIGHT: 220 LBS | HEIGHT: 68 IN | DIASTOLIC BLOOD PRESSURE: 51 MMHG | RESPIRATION RATE: 16 BRPM | OXYGEN SATURATION: 96 % | HEART RATE: 62 BPM | TEMPERATURE: 97.9 F | BODY MASS INDEX: 33.34 KG/M2 | SYSTOLIC BLOOD PRESSURE: 136 MMHG

## 2023-02-15 DIAGNOSIS — R07.9 CHEST PAIN, UNSPECIFIED TYPE: Primary | ICD-10-CM

## 2023-02-15 DIAGNOSIS — R13.19 ESOPHAGEAL DYSPHAGIA: ICD-10-CM

## 2023-02-15 LAB
ALBUMIN SERPL-MCNC: 3.5 G/DL (ref 3.4–5)
ALBUMIN/GLOB SERPL: 1.2 (ref 0.8–1.7)
ALP SERPL-CCNC: 75 U/L (ref 45–117)
ALT SERPL-CCNC: 21 U/L (ref 16–61)
ANION GAP SERPL CALC-SCNC: 0 MMOL/L (ref 3–18)
AST SERPL-CCNC: 17 U/L (ref 10–38)
BASOPHILS # BLD: 0 K/UL (ref 0–0.1)
BASOPHILS NFR BLD: 0 % (ref 0–2)
BILIRUB SERPL-MCNC: 1.1 MG/DL (ref 0.2–1)
BUN SERPL-MCNC: 21 MG/DL (ref 7–18)
BUN/CREAT SERPL: 25 (ref 12–20)
CALCIUM SERPL-MCNC: 9.9 MG/DL (ref 8.5–10.1)
CHLORIDE SERPL-SCNC: 104 MMOL/L (ref 100–111)
CO2 SERPL-SCNC: 36 MMOL/L (ref 21–32)
CREAT SERPL-MCNC: 0.85 MG/DL (ref 0.6–1.3)
DIFFERENTIAL METHOD BLD: ABNORMAL
EOSINOPHIL # BLD: 0.2 K/UL (ref 0–0.4)
EOSINOPHIL NFR BLD: 2 % (ref 0–5)
ERYTHROCYTE [DISTWIDTH] IN BLOOD BY AUTOMATED COUNT: 12.3 % (ref 11.6–14.5)
GLOBULIN SER CALC-MCNC: 3 G/DL (ref 2–4)
GLUCOSE SERPL-MCNC: 120 MG/DL (ref 74–99)
HCT VFR BLD AUTO: 38 % (ref 36–48)
HGB BLD-MCNC: 12.4 G/DL (ref 13–16)
IMM GRANULOCYTES # BLD AUTO: 0 K/UL (ref 0–0.04)
IMM GRANULOCYTES NFR BLD AUTO: 1 % (ref 0–0.5)
LYMPHOCYTES # BLD: 1 K/UL (ref 0.9–3.6)
LYMPHOCYTES NFR BLD: 11 % (ref 21–52)
MCH RBC QN AUTO: 33.3 PG (ref 24–34)
MCHC RBC AUTO-ENTMCNC: 32.6 G/DL (ref 31–37)
MCV RBC AUTO: 102.2 FL (ref 78–100)
MONOCYTES # BLD: 0.5 K/UL (ref 0.05–1.2)
MONOCYTES NFR BLD: 6 % (ref 3–10)
NEUTS SEG # BLD: 7 K/UL (ref 1.8–8)
NEUTS SEG NFR BLD: 80 % (ref 40–73)
NRBC # BLD: 0 K/UL (ref 0–0.01)
NRBC BLD-RTO: 0 PER 100 WBC
PLATELET # BLD AUTO: 156 K/UL (ref 135–420)
PMV BLD AUTO: 10.9 FL (ref 9.2–11.8)
POTASSIUM SERPL-SCNC: 4.1 MMOL/L (ref 3.5–5.5)
PROT SERPL-MCNC: 6.5 G/DL (ref 6.4–8.2)
RBC # BLD AUTO: 3.72 M/UL (ref 4.35–5.65)
SODIUM SERPL-SCNC: 140 MMOL/L (ref 136–145)
TROPONIN I SERPL HS-MCNC: 12 NG/L (ref 0–78)
WBC # BLD AUTO: 8.7 K/UL (ref 4.6–13.2)

## 2023-02-15 PROCEDURE — 80053 COMPREHEN METABOLIC PANEL: CPT

## 2023-02-15 PROCEDURE — 85025 COMPLETE CBC W/AUTO DIFF WBC: CPT

## 2023-02-15 PROCEDURE — 99285 EMERGENCY DEPT VISIT HI MDM: CPT

## 2023-02-15 PROCEDURE — 71045 X-RAY EXAM CHEST 1 VIEW: CPT

## 2023-02-15 PROCEDURE — 84484 ASSAY OF TROPONIN QUANT: CPT

## 2023-02-15 RX ORDER — SUCRALFATE 1 G/1
1 TABLET ORAL 4 TIMES DAILY PRN
Qty: 40 TABLET | Refills: 3 | Status: SHIPPED | OUTPATIENT
Start: 2023-02-15 | End: 2023-02-15 | Stop reason: SDUPTHER

## 2023-02-15 RX ORDER — SUCRALFATE 1 G/1
1 TABLET ORAL 4 TIMES DAILY PRN
Qty: 40 TABLET | Refills: 3 | Status: SHIPPED | OUTPATIENT
Start: 2023-02-15 | End: 2023-02-25

## 2023-02-15 NOTE — ED TRIAGE NOTES
Pt presents to ED with pmhx of dysphagia and c/c of possible pork chop in throat, pt voice sounds wet and pt is spitting out secretions, pt is able to make needs known speaking in complete sentences

## 2023-02-15 NOTE — DISCHARGE INSTRUCTIONS
Please carefully read all discharge instructions  Please follow up with your primary care doctor in 1-2 days    Please follow-up with a primary care physician and if you do not have one currently use the contact information provided to obtain an appointment. If none was provided please call the number on the back of your insurance card to locate a Primary care doctor. Many offices have \"cancellation lists\" that you can ask to be placed on; should a patient with an earlier appointment cancel you will be notified to take their place. Please return to the Emergency Room immediately if your symptoms worsen. Please return to the Emergency Department if you develop a fever, chills, cannot eat or drink due to nausea or vomiting, or if any of your symptoms worsen. If you do not have insurance you can use the below for your medications. [email or web address]    What are GoodRx coupons? GoodRx coupons will help you pay less than the cash price for your prescription. Becka Chary free to use and are accepted at virtually every [place] pharmacy. Your pharmacist will know how to enter the codes on the coupon to pull up the lowest discount available. Ze[employer]elin Activation    Thank you for requesting access to [employer]. Please follow the instructions below to securely access and download your online medical record. Ze[employer]elin allows you to send messages to your doctor, view your test results, renew your prescriptions, schedule appointments, and more. How Do I Sign Up? In your internet browser, go to [email or web address]  Click on the First Time User? Click Here link in the Sign In box. You will be redirect to the New Member Sign Up page. Enter your [employer] Access Code exactly as it appears below. You will not need to use this code after youve completed the sign-up process. If you do not sign up before the expiration date, you must request a new code.     Zeppelin Access Code: [email or web address]  Expires: 4/1/2023  1:51 PM    Enter the last four digits of your Social Security Number (xxxx) and Date of Birth (mm/dd/yyyy) as indicated and click Submit. You will be taken to the next sign-up page. Create a [employer] ID. This will be your [employer] login ID and cannot be changed, so think of one that is secure and easy to remember. Create a [employer] password. You can change your password at any time. Enter your Password Reset Question and Answer. This can be used at a later time if you forget your password. Enter your e-mail address. You will receive e-mail notification when new information is available in [address] Click Sign Up. You can now view and download portions of your medical record. Click the Allworx link to download a portable copy of your medical information. Additional Information    If you have questions, please visit the Frequently Asked Questions section of the [employer] website at [email or web address] Remember, [employer] is NOT to be used for urgent needs. For medical emergencies, dial 911.

## 2023-02-15 NOTE — ED PROVIDER NOTES
[facility] EMERGENCY DEPT  EMERGENCY DEPARTMENT ENCOUNTER    Patient Name: Mert Armendariz  MRN: [id number]  YOB: 1940  Provider: Rj Moody MD  PCP: Oneyda Currie MD   Time/Date of evaluation: 12:24 PM EST on 2/15/23    History of Presenting Illness     Chief Complaint   Patient presents with    Chest Pain    Dysphagia       History Provided By: Patient and Patient's Wife     History Chris Cheatham):   Mert Armendariz is a 80 y.o. male who presents to the emergency department with concerns over dysphagia since last night. He believes it was meat. He has been doing well and not had any problems in \"quite some time\" . He has had a chronic problem with dysphagia over the years and says that he has had esophageal dilation several times. He had one event that he had to have Dr. Marianela Patel clear the food bolus. At the time of my exam, all of his symptoms are resolved. The chest pain that he had he states was from the bolus, not acs like pain, not sob, no NVD, tolerated liquids fine. Nursing Notes were all reviewed and agreed with or any disagreements were addressed in the HPI.     Past History     Past Medical History:  Past Medical History:   Diagnosis Date    Arthritis     CAD (coronary artery disease)     s/p CABG    GERD (gastroesophageal reflux disease)     well controlled    Hypercholesteremia     Hypertension     Morbid obesity ([address]        Past Surgical History:  Past Surgical History:   Procedure Laterality Date    APPENDECTOMY      COLONOSCOPY N/A 10/7/2016    COLONOSCOPY, POLYPECTOMY performed by Nely Guerra MD at [address] [place] [address] 10/14/2018    ORTHOPEDIC SURGERY Right     total right knee replacement    ORTHOPEDIC SURGERY      back surgery    [place] ABDOMEN SURGERY PROC UNLISTED Right     hernia repair    [place] CARDIAC SURG PROCEDURE UNLIST  2007    CABGX2 or 3    SHOULDER ARTHROPLASTY Right     TONSILLECTOMY AND ADENOIDECTOMY       Family History:  No family history on file.    Social History:  Social History     Tobacco Use    Smoking status: Former     Types: Cigarettes     Quit date: 10/5/1976     Years since quittin.4    Smokeless tobacco: Never   Substance Use Topics    Alcohol use: Yes     Alcohol/week: 8.0 - 9.0 standard drinks    Drug use: No       Medications:  No current facility-administered medications for this encounter.     Current Outpatient Medications   Medication Sig Dispense Refill    sucralfate (CARAFATE) 1 GM tablet Take 1 tablet by mouth 4 times daily as needed (gerd) 40 tablet 3       Allergies:  Allergies   Allergen Reactions    Codeine Nausea Only       Social Determinants of Health:  Social Determinants of Health     Tobacco Use: Not on file   Alcohol Use: Not on file   Financial Resource Strain: Not on file   Food Insecurity: Not on file   Transportation Needs: Not on file   Physical Activity: Not on file   Stress: Not on file   Social Connections: Not on file   Intimate Partner Violence: Not on file   Depression: Not on file   Housing Stability: Not on file       Review of Systems     Negative except as listed above in HPI.    Physical Exam     Vitals:    02/15/23 1155 02/15/23 1247   BP: (!) 136/51    Pulse: (!) 103 62   Resp: 16    Temp: 97.9 °F (36.6 °C)    SpO2: 93% 96%   Weight: 220 lb (99.8 kg)    Height: 5' 8\" (1.727 m)      Physical Exam  Vital signs wdl  General: Patient is awake and alert, resting comfortably in no acute distress  Head: Normocephalic and atraumatic  Eyes: Extraocular muscles intact, no conjunctival pallor  Ear, nose, throat: Normal external exam  Neck: Normal range of motion  Cardiovascular: RRR, warm, well-perfused extremities  Respiratory: Patient is in no respiratory distress, normal respiratory effort  GI: soft, non-tender, non-distended  MSK: No gross deformities appreciated  Extremities: pulses intact with good cap refills, no LE pitting edema or calf tenderness  Skin:  Warm, dry, and intact  Neuro: The patient is alert and oriented, no gross motor or sensory defects noted. Psych: Appropriate mood and affect. Diagnostic Study Results     Labs:  No results found for this or any previous visit (from the past 12 hour(s)). Radiologic Studies:   Non-plain film images such as CT, Ultrasound and MRI are read by the radiologist. Plain radiographic images are visualized and preliminarily interpreted by the ED Provider with the below findings:       Interpretation per the Radiologist below, if available at the time of this note:    XR CHEST PORTABLE   Final Result   No acute findings. EKG interpretation:  EKG non diagnostic, without acute ST elevation, arrhythmia, prolonged QT, or evidence of Brugada      Procedures     Unless otherwise noted below, none. Procedures    ED Course     12:24 PM EST JARVIS Rich MD) am the first provider for this patient. Initial assessment performed. I reviewed the vital signs, available nursing notes, past medical history, past surgical history, family history and social history. The patients presenting problems have been discussed, and they are in agreement with the care plan formulated and outlined with them. I have encouraged them to ask questions as they arise throughout their visit. Records Reviewed: Nursing Notes and Old Medical Records    Is this patient to be included in the SEP-1 core measure due to severe sepsis or septic shock? No Exclusion criteria - the patient is NOT to be included for SEP-1 Core Measure due to: Infection is not suspected    MEDICATIONS ADMINISTERED IN THE ED:  Medications - No data to display    ED Course as of 02/19/23 2013   Wed Feb 15, 2023   1350 Patient feeling much better now, food passed, tolerating PO fluids.    Will give carafate.  [DM]      ED Course User Index  [DM] Dayne Newton MD       None    Screenings                  [facility] Assessment  BP: (!) 136/51  Heart Rate: 62       Medical Decision Making     DDX: suspected globus, now resolved. No further complaints. Considered but do not suspect acs, pe, no further pathology. Not c/w gerd or PUD. DISCUSSION:  Severity of condition: mild  Acuity of condition: chornic    80 y.o. male well appearing, nad. In evaluation of the above differential diagnosis, consideration was given to the following tests and treatments. The decision to perform testing and results were discussed with the patient and spouse. I discussed each of these tests and considerations with the patient and spouse. They agree with the plan of discharge and outpatient follow-up. Additional Considerations: The following Chronic Conditions impacted the patient's care: history of esophageal dilatation    No acute pathology necessitating further emergent workup or hospital admission is suspected or found. Will discharge home with follow up. He is comfortable with the plan and discharge at this time. Expressed the importance of follow up for current symptoms and he agrees and was advised on what signs/symptoms to return immediately to the ER. Diagnosis and Disposition     DISPOSITION:  DISPOSITION Decision To Discharge 02/15/2023 02:11:03 PM      DISCHARGE NOTE:  Lyla Malave's  results have been reviewed with him. He has been counseled regarding his diagnosis, treatment, and plan. He verbally conveys understanding and agreement of the signs, symptoms, diagnosis, treatment and prognosis and additionally agrees to follow up as discussed. He also agrees with the care-plan and conveys that all of his questions have been answered. I have also provided discharge instructions for him that include: educational information regarding their diagnosis and treatment, and list of reasons why they would want to return to the ED prior to their follow-up appointment, should his condition change.  He has been provided with education for proper emergency department utilization. CLINICAL IMPRESSION:  1. Chest pain, unspecified type    2. Esophageal dysphagia        PLAN:  D/C Home    DISCHARGE MEDICATIONS:  Discharge Medication List as of 2/15/2023  2:44 PM             Details   sucralfate (CARAFATE) 1 GM tablet Take 1 tablet by mouth 4 times daily as needed (gerd), Disp-40 tablet, R-3Print             DISCONTINUED MEDICATIONS:  Discharge Medication List as of 2/15/2023  2:44 PM          PATIENT REFERRED TO:  Follow Up with:  Michaelene Spatz., MD   [address] [place] [id number] [phone]    Call in 1 day      Baldemar Bond MD  [address] [place] [address] [facility] [phone]    Call in 1 day  for follow up    Oneyda Kirby MD am the primary clinician of record. Dragon Disclaimer     Please note that this dictation was completed with Timescape, the computer voice recognition software. Quite often unanticipated grammatical, syntax, homophones, and other interpretive errors are inadvertently transcribed by the computer software. Please disregard these errors. Please excuse any errors that have escaped final proofreading.     Jennifer Montes MD    (Electronically signed)            Adrienne Tariq MD  Resident  02/19/23 2014

## 2023-02-19 LAB
EKG ATRIAL RATE: 54 BPM
EKG DIAGNOSIS: NORMAL
EKG P AXIS: 26 DEGREES
EKG P-R INTERVAL: 188 MS
EKG Q-T INTERVAL: 424 MS
EKG QRS DURATION: 90 MS
EKG QTC CALCULATION (BAZETT): 402 MS
EKG R AXIS: 6 DEGREES
EKG T AXIS: 39 DEGREES
EKG VENTRICULAR RATE: 54 BPM

## 2023-03-01 NOTE — PROGRESS NOTES
Bereavement Note:     responded to the death of Juan Manuel Marcus, who is a 80 y.o., male, offering Spiritual Care to patient and family. Date of Death: 3/1/2023    Extended Emergency Contact Information  Primary Emergency Contact: Ar Malave  Address: [address] Phone: [phone]  Mobile Phone: [phone]  Relation: Spouse      Home: [address] [place] [address] will continue to follow family and will provide spiritual care as needed.   [address] [facility]   [phone] - Office

## 2023-03-01 NOTE — PROGRESS NOTES
Received call from Dr. Carolene Lombard: Patient with history of HTN, CAD s/p CABG, carotid artery disease with some carotid surgery done in the past; had nausea/vomiting on 2/28/2023 morning when EMS was called but patient refused to go to ER. Patient woke up with vomiting and became unresponsive; EMS was called by wife; patient was unresponsive for about 5 minutes before EMS arrived; total 38 minutes of CPR was performed in the field and in tubated in the field, with ROSC for about 30 minutes and then again he lost pulse; another 15 minutes of CPR was performed. On arrival to [facility] ER, he was continued on CPR and he had remained asystole throughout the prolonged CPR in ER; he regained pulse ROSC. He is on Levophed vasopressor. Labs are pending. EKG is pending. Recommended to complete the work-up including labs, EKG, CT head, CT chest abdomen pelvis. Waiting for creatin ine before deciding CT with or without contrast wife reported that patient is DNR but patient was already intubated in the field. With prolonged downtime and CPR, he is at very high risk of having anoxic brain damage. Depending on the above work-up, wife needs to be updated. If patient survives, then patient will be admitted to ICU. Patient will need palliative care consult in the morning. Continue ventilator support, hemodynamic s upport. Further recommendation depending on the work-up. I will wait for an update on the work-up results phone call from ER. Please call us with any questions. Jordan Graham.

## 2023-03-01 NOTE — ED PROVIDER NOTES
THE [facility] EMERGENCY DEPT  EMERGENCY DEPARTMENT ENCOUNTER    Patient Name: Kandi Herrera  MRN: [id number]  YOB: 1940  Provider: César Johnson MD  PCP: Soraya Morataya MD   Time/Date of evaluation: 3:19 AM EST on 3/1/23    History of Presenting Illness     Chief Complaint   Patient presents with    Cardiac Arrest       History Provided by: EMS   History is limited by: Acuity of Condition    HISTORY Donnichar Mac):   Kandi Herrera is a 80 y.o. male with a PMHX of CAD, CABG, hypertension, hyperlipidemia, carotid artery disease  who presents to the emergency department (room 3) by EMS C/O cardiac arrest onset this morning. Associated sxs include unresponsiveness. Pt is unable to confirm or deny any other sxs or complaints due to unresponsiveness. Per EMS, they responded well to earlier today for nausea and vomiting. When they responded to the house this evening, the patient was slumped over the bathtub with emesis on the floor and in the bathtub. They removed him from the bathroom into an area where they could work. They began CPR. CPR was started at approximately 1:45 AM.  Patient got a 7 rounds of epi and 1 round of bicarb. ROSC was achieved at 2:17 AM.  Patient was placed on an epinephrine drip. ROSC was lost at 2:45 AM.  CPR was restarted and patient was given another dose of epinephrine. CPR was in progress on arrival to the ED. Nursing Notes were all reviewed and agreed with or any disagreements were addressed in the HPI.     Past History     PAST MEDICAL HISTORY:  Past Medical History:   Diagnosis Date    Arthritis     CAD (coronary artery disease)     s/p CABG    GERD (gastroesophageal reflux disease)     well controlled    Hypercholesteremia     Hypertension     Morbid obesity ([facility] [address]        PAST SURGICAL HISTORY:  Past Surgical History:   Procedure Laterality Date    APPENDECTOMY      COLONOSCOPY N/A 10/7/2016    COLONOSCOPY, POLYPECTOMY performed by Saundra Denson MD at THE [facility] ENDOSCOPY    CORONARY ANGIOPLASTY WITH STENT PLACEMENT Right 10/14/2018    ORTHOPEDIC SURGERY Right     total right knee replacement    ORTHOPEDIC SURGERY      back surgery    AR ABDOMEN SURGERY PROC UNLISTED Right     hernia repair    AR CARDIAC SURG PROCEDURE UNLIST      CABGX2 or 3    SHOULDER ARTHROPLASTY Right     TONSILLECTOMY AND ADENOIDECTOMY         FAMILY HISTORY:  No family history on file. SOCIAL HISTORY:  Social History     Tobacco Use    Smoking status: Former     Types: Cigarettes     Quit date: 10/5/1976     Years since quittin.4    Smokeless tobacco: Never   Substance Use Topics    Alcohol use: Yes     Alcohol/week: 8.0 - 9.0 standard drinks    Drug use: No       MEDICATIONS:  Current Facility-Administered Medications   Medication Dose Route Frequency Provider Last Rate Last Admin    norepinephrine (LEVOPHED) 16 mg in sodium chloride 0.9 % 250 mL infusion  1-100 mcg/min IntraVENous Continuous Jerilyn Mayer MD 37.5 mL/hr at 23 0350 40 mcg/min at 23 0350    sodium bicarbonate 8.4 % injection 50 mEq  50 mEq IntraVENous Jaylon Jiménez MD         Current Outpatient Medications   Medication Sig Dispense Refill    sucralfate (CARAFATE) 1 GM tablet Take 1 tablet by mouth 4 times daily as needed (gerd) 40 tablet 3       ALLERGIES:  Allergies   Allergen Reactions    Codeine Nausea Only       SOCIAL DETERMINANTS OF HEALTH:  Social Determinants of Health     Tobacco Use: Not on file   Alcohol Use: Not on file   Financial Resource Strain: Not on file   Food Insecurity: Not on file   Transportation Needs: Not on file   Physical Activity: Not on file   Stress: Not on file   Social Connections: Not on file   Intimate Partner Violence: Not on file   Depression: Not on file   Housing Stability: Not on file       Review of Systems     Negative except as listed above in HPI.     Physical Exam     Vitals:    23 0336 23 0346 23 0403 23 0419   BP:  (!) 91/36 (!) 64/32 (!) 58/35   Pulse: 57 72 (!) 39 (!) 40   Resp: 25 18     Temp:       TempSrc:       SpO2:  100% 94% 94%   Weight:           Physical Exam  Vitals reviewed. Constitutional:       General: He is in acute distress. Appearance: He is normal weight. He is ill-appearing. Interventions: He is intubated. Comments: Automatic compression device in place   HENT:      Head: Normocephalic and atraumatic. Nose: Nose normal.   Cardiovascular:      Pulses:           Femoral pulses are 0 on the right side and 0 on the left side. Comments: Pulses present only with CPR in progress  Pulmonary:      Effort: He is intubated. Breath sounds: Examination of the right-upper field reveals rales. Examination of the left-upper field reveals rales. Examination of the right-middle field reveals rales. Examination of the left-middle field reveals rales. Examination of the right-lower field reveals rales. Examination of the left-lower field reveals rales. Rales present. Comments: No respiratory drive, coarse breath sounds present only with bagging  Abdominal:      General: There is distension. Musculoskeletal:      Cervical back: No crepitus. Skin:     General: Skin is dry. Coloration: Skin is cyanotic, mottled and pale. Neurological:      Mental Status: He is unresponsive. GCS: GCS eye subscore is 1. GCS verbal subscore is 1. GCS motor subscore is 1.        Diagnostic Study Results     LABS:  Recent Results (from the past 12 hour(s))   POCT Glucose    Collection Time: 03/01/23  3:12 AM   Result Value Ref Range    POC Glucose 244 (H) 70 - 110 mg/dL   POCT Blood Gas & Electrolytes    Collection Time: 03/01/23  3:20 AM   Result Value Ref Range    POC Ionized Calcium 1.30 1.12 - 1.32 mmol/L    BASE DEFICIT (POC) 7.5 mmol/L    HCO3, Mixed 25.5 22 - 26 MMOL/L    POC TCO2 26 (H) 19 - 24 MMOL/L    POC O2 SAT 33 %    Source VENOUS BLOOD      Performed by: Norbert Elmore     POC Sodium 145 136 - 145 mmol/L    POC Potassium 5.0 3.5 - 5.1 mmol/L    POC Glucose 254 (H) 65 - 100 mg/dL    POC Creatinine 1.05 0.6 - 1.3 mg/dL    POC Lactic Acid 11.88 (HH) 0.40 - 2.00 mmol/L    POC Chloride 104 98 - 107 mmol/L    Anion Gap, POC Cannot be calculated 10 - 20      PH, VENOUS (POC) 7.04 (LL) 7.32 - 7.42      PCO2, Carver, POC 94.1 (H) 41 - 51 MMHG    PO2, VENOUS (POC) 30 25 - 40 mmHg    eGFR, POC >60 >60 ml/min/1.73m2   EKG 12 Lead    Collection Time: 03/01/23  3:29 AM   Result Value Ref Range    Ventricular Rate 61 BPM    QRS Duration 98 ms    Q-T Interval 456 ms    QTc Calculation (Bazett) 459 ms    R Axis -10 degrees    T Axis 51 degrees    Diagnosis Atrial fibrillation    CBC with Auto Differential    Collection Time: 03/01/23  3:30 AM   Result Value Ref Range    WBC 17.2 (H) 4.6 - 13.2 K/uL    RBC 3.85 (L) 4.35 - 5.65 M/uL    Hemoglobin 12.9 (L) 13.0 - 16.0 g/dL    Hematocrit 41.8 36.0 - 48.0 %    .6 (H) 78.0 - 100.0 FL    MCH 33.5 24.0 - 34.0 PG    MCHC 30.9 (L) 31.0 - 37.0 g/dL    RDW 12.0 11.6 - 14.5 %    Platelets 172 048 - 177 K/uL    MPV 11.4 9.2 - 11.8 FL    Nucleated RBCs 0.3 (H) 0  WBC    nRBC 0.05 (H) 0.00 - 0.01 K/uL    Seg Neutrophils 72 40 - 73 %    Lymphocytes 19 (L) 21 - 52 %    Monocytes 4 3 - 10 %    Eosinophils % 1 0 - 5 %    Basophils 0 0 - 2 %    Immature Granulocytes 4 (H) 0.0 - 0.5 %    Segs Absolute 12.4 (H) 1.8 - 8.0 K/UL    Absolute Lymph # 3.2 0.9 - 3.6 K/UL    Absolute Mono # 0.7 0.05 - 1.2 K/UL    Absolute Eos # 0.1 0.0 - 0.4 K/UL    Basophils Absolute 0.1 0.0 - 0.1 K/UL    Absolute Immature Granulocyte 0.7 (H) 0.00 - 0.04 K/UL    Differential Type AUTOMATED     POC G3: BLOOD GASES INCLUDES CALC.  TCO2, HCO3, BASE EXCESS, SO2    Collection Time: 03/01/23  3:52 AM   Result Value Ref Range    DEVICE ADULT VENT      FIO2 100 %    pH, Arterial, POC 7.03 (LL) 7.35 - 7.45      pCO2, Arterial, POC 83.1 (H) 35.0 - 45.0 MMHG    pO2, Arterial,  (H) 80 - 100 MMHG    HCO3, Mixed 21.8 (L) 22 - 26 MMOL/L    SO2c, Arterial, POC 97.1 (H) 92 - 97 %    BASE DEFICIT (POC) 10.6 mmol/L    Mode ASSIST CONTROL      POC TIDAL VOLUME 400 ml    Set Rate 25 bpm    POC PEEP 5 cmH2O    POC Acosta's Test NOT APPLICABLE      Site RIGHT RADIAL      Specimen type: ARTERIAL      Performed by: Esther Saavedra        RADIOLOGIC STUDIES:   Non x-ray images such as CT, Ultrasound and MRI are read by the radiologist. X-ray images are visualized and preliminarily interpreted by the ED Provider with the findings as listed in the ED Course section below. Interpretation per the Radiologist is listed below, if available at the time of this note:    XR CHEST PORTABLE   Final Result   Life-support lines and tubes as described. Small right apical pneumothorax. Pulmonary edema. The findings were called to Dr. Michelle Gonzalez on 3/1/23 at 0420 am by myself.      789. CT HEAD WO CONTRAST    (Results Pending)   CT CHEST ABDOMEN PELVIS WO CONTRAST Additional Contrast? None    (Results Pending)       Procedures     Procedures    ED Course     3:19 AM TAMIR Wheat MD) am the first provider for this patient. Initial assessment performed. I reviewed the vital signs, available nursing notes, past medical history, past surgical history, family history and social history. The patients presenting problems have been discussed, and they are in agreement with the care plan formulated and outlined with them. I have encouraged them to ask questions as they arise throughout their visit. RECORDS REVIEWED: Nursing Notes    Is this patient to be included in the SEP-1 core measure due to severe sepsis or septic shock?  No Exclusion criteria - the patient is NOT to be included for SEP-1 Core Measure due to: 2+ SIRS criteria are not met    MEDICATIONS ADMINISTERED IN THE ED:  Medications   norepinephrine (LEVOPHED) 16 mg in sodium chloride 0.9 % 250 mL infusion (40 mcg/min IntraVENous Rate/Dose Change 3/1/23 4490)   sodium bicarbonate 8.4 % injection 50 mEq (has no administration in time range)   EPINEPHrine 1 MG/10ML injection (100 mcg IntraVENous Given 3/1/23 0417)   calcium chloride 10 % injection (1,000 mg IntraVENous Given 3/1/23 0306)   sodium bicarbonate 8.4 % injection (50 mEq IntraVENous Given 3/1/23 0310)   0.9 % sodium chloride bolus (  Stopped 3/1/23 0445)       ED Course as of 03/01/23 0511   Wed Mar 01, 2023   0314 Patient remained in PEA arrest in the ED, he was given 3 rounds of epi, calcium, bicarb. [JM]   2968 ROSC [JM]   0329 I spent 10 minutes in discussion with the wife and the  from [facility]. The wife states that the patient does have a DNR and she wants to keep that in place. [JM]   2329 EKG interpretation:  Rhythm: a-fib. Rate: 62 bpm; no STEMI  EKG read by César Johnson MD    [JM]   2779 CONSULT NOTE:   César Johnson MD spoke with Dr. Cornelio Kirby, Cardiology, he will see the patient in the morning. [JM]   4550 CONSULT NOTE:   César Johnson MD spoke with Dr. Papito Thompson, he requests CT scan of the head, chest abdomen pelvis. [JM]   0032 CONSULT NOTE:   César Johnson MD spoke with Dr. Alice Quintero, Radiologist, Pt has a small right-sided pneumothorax on chest x-ray   [JM]   2758 Had a discussion with the wife, Jenny Whalen, she states that he would not want to have the breathing tube in keeping him alive. She would like him to be kept comfortable but she does not want to keep the breathing tube in place. [JM]   7622 While waiting to get CT, patient became more bradycardic, his nurse called from CT for guidance and the patient lost his pulses. I instructed them to honor his DNR and return him to the ED. [JM]   5361 Patient has no pulse, asystole on the cardiac monitor, no spontaneous respirations, no gag reflex, pupils fixed and dilated.   Time of death 4:32 AM [JM]   7750 CONSULT NOTE:   César Johnson MD spoke with Investigator Allen, [place], no concerns, they decline the case.    [JM]      ED Course User Index  [JM] Gregorio Oconnor MD       IP CONSULT TO CARDIOLOGY  IP CONSULT TO INTENSIVIST  IP CONSULT TO HOSPITALIST  IP CONSULT TO FAMILY MEDICINE    Medical Decision Making     SCREENING TOOLS:  None    DDX: Nausea, vomiting, intraperitoneal infection, coronary artery disease, cardiac arrest    DISCUSSION:  This appears to be a severe conditon. This appears to be an acute condition. 80 y.o. male presenting in cardiac arrest.  In evaluation of the above differential diagnosis, consideration was given to the following tests and treatments. POC labs showed severe acidosis. Patient had been coded for 53 minutes outside of the hospital and an additional 15 minutes in the hospital.  Patient regained ROSC for 30 minutes out of the hospital and for an additional 77 minutes in the hospital.  Patient was given multiple rounds of epinephrine, bicarb, calcium in place, epinephrine drip at outside the hospital and a Levophed drip inside the hospital.  Patient still required push dose epinephrine to support his pulse and blood pressure. Patient's i-STAT metabolic panel did not show any marked abnormalities. His white blood cell count was markedly elevated and likely due to due to marginalization. Head CT was unobtainable due to loss of pulses. Chest x-ray did show a small pneumothorax which is likely due to CPR. The case was discussed with cardiology after the patient regained circulation in the ED with the plan to see the patient in the morning. Discussed with intensivist who requested additional CT scans prior to the patient dying. Ultimately the patient's wife had made a decision to withdraw care but he  prior to return from the CT. The family's wishes were respected and the patient was not coded at that time. The decision to perform testing and results were discussed with the family. I discussed each of these tests and considerations with the family.  They agree with the plan of  transfer to the  home . ADDITIONAL CONSIDERATIONS:  The following Chronic Conditions impacted the patient's care: Coronary artery disease because this devolved into a cardiac arrest.  Imaging considered in the ED, but not accomplished include: Head CT, CT scan of the chest abdomen pelvis because patient . The following interventions were considered in the ED, but not provided: Central line because: Patient  prior to placement    Critical Care Time:     I have spent 77 minutes of critical care time involved in lab review, consultations with specialist, family decision-making, and documentation. This time does not include time spent on separately billable procedures. During this entire length of time, I was immediately available to the patient. Critical Care: The reason for providing this level of medical care for this critically ill patient was due a critical illness that impaired one or more vital organ systems such that there was a high probability of imminent or life-threatening deterioration in the patient's condition. This care involved high complexity decision making to assess, manipulate, and support vital system functions, to treat this degreee vital organ system failure and to prevent further life-threatening deterioration of the patients condition. Maxx Arnold MD    Diagnosis and Disposition     DISPOSITION  2023 04:40:30 AM    DEATH NOTE:  Discussed transfer of remains the patient's family. ME declined the case. Family w requested transfer to the  home. Discussed conditions leading up to death. Family understands and agrees with care plan. CLINICAL IMPRESSION    1. Nausea and vomiting, unspecified vomiting type    2. Traumatic pneumothorax, initial encounter    3. Cardiac arrest ([facility] [address]    4.         PLAN    Transfer to [address] [place] [address]    Nidhi Calderon MD am the primary clinician of record.     Jaziel Disclaimer Please note that this dictation was completed with [employer], the computer voice recognition software. Quite often unanticipated grammatical, syntax, homophones, and other interpretive errors are inadvertently transcribed by the computer software. Please disregard these errors. Please excuse any errors that have escaped final proofreading.     Ashli Arana MD  (Electronically signed)           Jerilyn Mayer MD  03/01/23 5699

## 2023-03-01 NOTE — ED NOTES
Pt back in room 2, provider @ bedside, no palpable pulse, asystole on monitor.  TOD 0432 by Dr Pattie Murillo, RN  03/01/23 16 Beck Harper RN  03/01/23 5945

## 2023-03-01 NOTE — ED NOTES
Pt noted to be bradycardiac @ 20 while waiting for CT scanner. 200 mcg push dose epi given per provider verbal order. Provider called by this RN and notified of HR, provider requested pt be brought back to ED.      Eben Otero RN  03/01/23 5591

## 2023-03-01 NOTE — ED NOTES
Medical examiner returned call, speaking to Dr Maria R Hopkins.      Francoise Mijares RN  03/01/23 9020

## 2023-03-01 NOTE — PROGRESS NOTES
Bereavement Note:     responded to the death of Fina Mccall, who is a 80 y.o., male, offering Spiritual Care to patient and family. Date of Death: 3/1/2023    Extended Emergency Contact Information  Primary Emergency Contact: Ar Malave  Address: [address] Phone: [phone]  Mobile Phone: [phone]  Relation: Spouse      Home: [address] [place] [address] will continue to follow family and will provide spiritual care as needed.   [address]   [phone] - Office

## 2023-03-01 NOTE — ED TRIAGE NOTES
Pt presents to ED by medic with CPR ongoing. Medics report call came out for an unresponsive male. Began CPR @ 0139, achieved ROSC @ 0217, lost pulses again @ 0245. 7 epis and 1 bicarb given PTA. . Pt intubated in field. Per medics, EMS was called to house around 2100 for n/v, pt refused to be transported. Wife reports pt worsened around 0100. Pt was found slumped over bathrub with emesis around in, pulseless and apneic. Provider@ bedside.

## (undated) DEVICE — TRAP SPEC COLL POLYP POLYSTYR --

## (undated) DEVICE — SINGLE PORT MANIFOLD: Brand: NEPTUNE 2

## (undated) DEVICE — SNARE POLYP SM W13MMXL240CM SHTH DIA2.4MM OVL FLX DISP

## (undated) DEVICE — MAJ-1414 SINGLE USE ADPATER BIOPSY VALV: Brand: SINGLE USE ADAPTOR BIOPSY VALVE

## (undated) DEVICE — REM POLYHESIVE ADULT PATIENT RETURN ELECTRODE: Brand: VALLEYLAB

## (undated) DEVICE — ENDO CARRY-ON PROCEDURE KIT INCLUDES ENZYMATIC SPONGE, GAUZE, BIOHAZARD LABEL, TRAY, LUBRICANT, DIRTY SCOPE LABEL, WATER LABEL, TRAY, DRAWSTRING PAD, AND DEFENDO 4-PIECE KIT.: Brand: ENDO CARRY-ON PROCEDURE KIT

## (undated) DEVICE — KENDALL RADIOLUCENT FOAM MONITORING ELECTRODE RECTANGULAR SHAPE: Brand: KENDALL

## (undated) DEVICE — MOUTHPIECE ENDOSCP 20X27MM --

## (undated) DEVICE — SPONGE GZ W4XL4IN COT 12 PLY TYP VII WVN C FLD DSGN

## (undated) DEVICE — MEDI-VAC NON-CONDUCTIVE SUCTION TUBING: Brand: CARDINAL HEALTH